# Patient Record
Sex: FEMALE | Race: WHITE | HISPANIC OR LATINO | Employment: OTHER | URBAN - METROPOLITAN AREA
[De-identification: names, ages, dates, MRNs, and addresses within clinical notes are randomized per-mention and may not be internally consistent; named-entity substitution may affect disease eponyms.]

---

## 2019-01-14 ENCOUNTER — HOSPITAL ENCOUNTER (OUTPATIENT)
Facility: MEDICAL CENTER | Age: 29
End: 2019-01-14
Attending: OBSTETRICS & GYNECOLOGY
Payer: COMMERCIAL

## 2019-01-14 LAB
ABO GROUP BLD: NORMAL
BASOPHILS # BLD AUTO: 0.3 % (ref 0–1.8)
BASOPHILS # BLD: 0.03 K/UL (ref 0–0.12)
BLD GP AB SCN SERPL QL: NORMAL
EOSINOPHIL # BLD AUTO: 0.15 K/UL (ref 0–0.51)
EOSINOPHIL NFR BLD: 1.7 % (ref 0–6.9)
ERYTHROCYTE [DISTWIDTH] IN BLOOD BY AUTOMATED COUNT: 40.3 FL (ref 35.9–50)
HCT VFR BLD AUTO: 39.7 % (ref 37–47)
HGB BLD-MCNC: 13.8 G/DL (ref 12–16)
IMM GRANULOCYTES # BLD AUTO: 0.02 K/UL (ref 0–0.11)
IMM GRANULOCYTES NFR BLD AUTO: 0.2 % (ref 0–0.9)
LYMPHOCYTES # BLD AUTO: 2.05 K/UL (ref 1–4.8)
LYMPHOCYTES NFR BLD: 22.8 % (ref 22–41)
MCH RBC QN AUTO: 29.7 PG (ref 27–33)
MCHC RBC AUTO-ENTMCNC: 34.8 G/DL (ref 33.6–35)
MCV RBC AUTO: 85.4 FL (ref 81.4–97.8)
MONOCYTES # BLD AUTO: 0.9 K/UL (ref 0–0.85)
MONOCYTES NFR BLD AUTO: 10 % (ref 0–13.4)
NEUTROPHILS # BLD AUTO: 5.84 K/UL (ref 2–7.15)
NEUTROPHILS NFR BLD: 65 % (ref 44–72)
NRBC # BLD AUTO: 0 K/UL
NRBC BLD-RTO: 0 /100 WBC
PLATELET # BLD AUTO: 199 K/UL (ref 164–446)
PMV BLD AUTO: 11.5 FL (ref 9–12.9)
RBC # BLD AUTO: 4.65 M/UL (ref 4.2–5.4)
RH BLD: NORMAL
WBC # BLD AUTO: 9 K/UL (ref 4.8–10.8)

## 2019-01-14 PROCEDURE — 87086 URINE CULTURE/COLONY COUNT: CPT

## 2019-01-14 PROCEDURE — 85025 COMPLETE CBC W/AUTO DIFF WBC: CPT

## 2019-01-14 PROCEDURE — 87389 HIV-1 AG W/HIV-1&-2 AB AG IA: CPT

## 2019-01-14 PROCEDURE — 86780 TREPONEMA PALLIDUM: CPT

## 2019-01-14 PROCEDURE — 87340 HEPATITIS B SURFACE AG IA: CPT

## 2019-01-14 PROCEDURE — 86901 BLOOD TYPING SEROLOGIC RH(D): CPT

## 2019-01-14 PROCEDURE — 86850 RBC ANTIBODY SCREEN: CPT

## 2019-01-14 PROCEDURE — 86762 RUBELLA ANTIBODY: CPT

## 2019-01-14 PROCEDURE — 86900 BLOOD TYPING SEROLOGIC ABO: CPT

## 2019-01-15 LAB
HBV SURFACE AG SER QL: NEGATIVE
HIV 1+2 AB+HIV1 P24 AG SERPL QL IA: NON REACTIVE
RUBV AB SER QL: 44.4 IU/ML
TREPONEMA PALLIDUM IGG+IGM AB [PRESENCE] IN SERUM OR PLASMA BY IMMUNOASSAY: NON REACTIVE

## 2019-01-17 LAB
BACTERIA UR CULT: NORMAL
SIGNIFICANT IND 70042: NORMAL
SITE SITE: NORMAL
SOURCE SOURCE: NORMAL

## 2021-12-20 ENCOUNTER — TELEPHONE (OUTPATIENT)
Dept: ENDOCRINOLOGY | Facility: MEDICAL CENTER | Age: 31
End: 2021-12-20

## 2021-12-20 NOTE — TELEPHONE ENCOUNTER
PT called to schedule NP appt with us. She has HPN so I did notify her that we did not accept that insurance here

## 2023-06-22 ENCOUNTER — HOSPITAL ENCOUNTER (EMERGENCY)
Facility: MEDICAL CENTER | Age: 33
End: 2023-06-22
Attending: OBSTETRICS & GYNECOLOGY | Admitting: OBSTETRICS & GYNECOLOGY
Payer: MEDICAID

## 2023-06-22 VITALS
HEIGHT: 62 IN | OXYGEN SATURATION: 98 % | BODY MASS INDEX: 32.76 KG/M2 | SYSTOLIC BLOOD PRESSURE: 121 MMHG | DIASTOLIC BLOOD PRESSURE: 75 MMHG | WEIGHT: 178 LBS | RESPIRATION RATE: 16 BRPM | HEART RATE: 87 BPM | TEMPERATURE: 97 F

## 2023-06-22 LAB
APPEARANCE UR: CLEAR
COLOR UR AUTO: YELLOW
GLUCOSE UR QL STRIP.AUTO: NEGATIVE MG/DL
KETONES UR QL STRIP.AUTO: ABNORMAL MG/DL
LEUKOCYTE ESTERASE UR QL STRIP.AUTO: NEGATIVE
NITRITE UR QL STRIP.AUTO: NEGATIVE
PH UR STRIP.AUTO: 6 [PH] (ref 5–8)
PROT UR QL STRIP: NEGATIVE MG/DL
RBC UR QL AUTO: NEGATIVE
SP GR UR STRIP.AUTO: 1.02 (ref 1–1.03)

## 2023-06-22 PROCEDURE — 99284 EMERGENCY DEPT VISIT MOD MDM: CPT

## 2023-06-22 PROCEDURE — 81002 URINALYSIS NONAUTO W/O SCOPE: CPT

## 2023-06-22 RX ORDER — METHIMAZOLE 5 MG/1
2.5 TABLET ORAL DAILY
COMMUNITY

## 2023-06-22 RX ORDER — BUSPIRONE HYDROCHLORIDE 10 MG/1
10 TABLET ORAL 2 TIMES DAILY
COMMUNITY

## 2023-06-23 NOTE — ED PROVIDER NOTES
Department of Obstetrics and Gynecology  Labor and Delivery History and Physical  OB ED Note    Date of Admission: 2023     ID: 33 y.o.  with IUP at 22w5d. HOLLY 10/21/2023    Primary OB: Dwayne Hogue M.D.     Attending OB: Katie Funk M.D.     CC: pink when she wiped     HPI: Adelina Mccarthy is a 33 y.o.  @22 weeks and 5 days who presents with a known placenta previa.  She was feeling well today when she went to the bathroom to urinate there was a small pink tinge to the wipe.  She had no pain associated with this she laid down for a little while and went back to check again and noted again a slight pink tinge.  She has had no berta bleeding.  No passage of clots no loss of fluid and she denies any pelvic pain.  She denies dysuria hematuria urgency or frequency.  She is not constipated.  She is feeling good fetal movement.  No unusual activities in the last couple of days.  She has been compliant with pelvic rest    ROS: 10 systems reviewed and negative except as noted above.    Obstetric History    - 2012, , female  G2 - 2014, , female, 5lb5oz  G3 - 2019, , male, 6lb8oz, complicated by cholestasis   G4 - SAb with D and C   G5 - SAb   G6 - Current pregnancy         Past Medical History  Surgical History   Graves disease  Depression     Appendectomy  Abdominoplasty  Breast augmentation  BBL  D and C       Gynecologic History  Social History   regular menses prior to pregnancy  denies Hx of abnormal pap smears.  denies Hx of STIs Tobacco: denies  EtOH: denies  Street Drugs: denies  She presents with her .   She is in nursing school      Medications  Allergies   No current facility-administered medications on file prior to encounter.     Current Outpatient Medications on File Prior to Encounter   Medication Sig Dispense Refill    methimazole (TAPAZOLE) 5 MG Tab Take 2.5 mg by mouth every day. Indications: Graves' Disease, Overactive Thyroid Gland      busPIRone  "(BUSPAR) 10 MG Tab tablet Take 10 mg by mouth 2 times a day. Indications: Major Depressive Disorder      Prenatal MV-Min-Fe Fum-FA-DHA (PRENATAL 1 PO) Take  by mouth.      Patient has no allergy information on record.       O: /75   Pulse 87   Temp 36.1 °C (97 °F) (Temporal)   Resp 16   Ht 1.575 m (5' 2\")   Wt 80.7 kg (178 lb)   SpO2 98%   BMI 32.56 kg/m²     Gen: NAD, AAO  Abd: Gravid, NTTP,Cephalic by [place], No rebound or guarding  Ext: NTTP, no edema, 2+DPP  Pelvic: SSE: No active bleeding. Cervix appears closed/thick. Very faint brown tinge to discharge    FHT:  present  [place]: not seen    Labs: O positive     A/P: Adelina Mccarthy is a 33 y.o.  at 22 weeks and 5 days with vaginal bleeding and a known placenta previa  Exam today is unremarkable.  There is no active bleeding.  She is not currently having any pain, and we are not picking up any contractions.  Strict bleeding and pain precautions have been reviewed with her.  At this time I think she can safely be discharged to home with precautions.   Fetal heart tones present  Rh+      Katie Funk M.D.,  2023, 9:45 PM    "

## 2023-06-23 NOTE — PROGRESS NOTES
"A , EDC 10/21 making her 22.5, presented to labor and delivery w/ c/o of \"ligh bleeding when wiping\" and pelciv pressure in presence of placenta previa. Pt denies RUCS and LOF; pt reports postive fetal movment.    2100: Pt oriented to triage room; monitors on/doppler; vital sx assessed.    : This RN called  Working and updated on pt status; MD to come to bedside.    ;  Working at bedside to assess pt; orders received.    :Discharge instructions given including  and placenta previa labor precautions, UC's, ROM, VB, abn FM, when to return to L&D, f/u with Dr. Hogue and [facility] , pelvic rest and modified bedrest. Questions answered at length. Pt and FOB verbalized understanding and agreement with POC and discharge. Discharge instructions signed.          "

## 2024-05-20 ENCOUNTER — HOSPITAL ENCOUNTER (OUTPATIENT)
Facility: MEDICAL CENTER | Age: 34
End: 2024-05-20
Attending: NURSE PRACTITIONER
Payer: COMMERCIAL

## 2024-05-20 ENCOUNTER — EMPLOYEE HEALTH (OUTPATIENT)
Dept: OCCUPATIONAL MEDICINE | Facility: CLINIC | Age: 34
End: 2024-05-20

## 2024-05-20 ENCOUNTER — EH NON-PROVIDER (OUTPATIENT)
Dept: OCCUPATIONAL MEDICINE | Facility: CLINIC | Age: 34
End: 2024-05-20

## 2024-05-20 DIAGNOSIS — Z02.1 PRE-EMPLOYMENT DRUG SCREENING: ICD-10-CM

## 2024-05-20 DIAGNOSIS — Z02.1 PRE-EMPLOYMENT HEALTH SCREENING EXAMINATION: ICD-10-CM

## 2024-05-20 DIAGNOSIS — Z02.89 ENCOUNTER FOR OCCUPATIONAL HEALTH ASSESSMENT: ICD-10-CM

## 2024-05-20 LAB
AMP AMPHETAMINE: NORMAL
BAR BARBITURATES: NORMAL
BZO BENZODIAZEPINES: NORMAL
COC COCAINE: NORMAL
INT CON NEG: NORMAL
INT CON POS: NORMAL
MDMA ECSTASY: NORMAL
MET METHAMPHETAMINES: NORMAL
MTD METHADONE: NORMAL
OPI OPIATES: NORMAL
OXY OXYCODONE: NORMAL
PCP PHENCYCLIDINE: NORMAL
POC URINE DRUG SCREEN OCDRS: NEGATIVE
THC: NORMAL

## 2024-05-20 PROCEDURE — 8915 PR COMPREHENSIVE PHYSICAL: Performed by: NURSE PRACTITIONER

## 2024-05-20 PROCEDURE — 80305 DRUG TEST PRSMV DIR OPT OBS: CPT | Performed by: NURSE PRACTITIONER

## 2024-05-20 PROCEDURE — 94375 RESPIRATORY FLOW VOLUME LOOP: CPT | Performed by: NURSE PRACTITIONER

## 2024-05-20 PROCEDURE — 86480 TB TEST CELL IMMUN MEASURE: CPT | Performed by: NURSE PRACTITIONER

## 2024-05-22 LAB
GAMMA INTERFERON BACKGROUND BLD IA-ACNC: 0.23 IU/ML
M TB IFN-G BLD-IMP: NEGATIVE
M TB IFN-G CD4+ BCKGRND COR BLD-ACNC: -0.02 IU/ML
MITOGEN IGNF BCKGRD COR BLD-ACNC: >10 IU/ML
QFT TB2 - NIL TBQ2: -0.01 IU/ML

## 2025-01-09 ENCOUNTER — EH NON-PROVIDER (OUTPATIENT)
Dept: OCCUPATIONAL MEDICINE | Facility: CLINIC | Age: 35
End: 2025-01-09

## 2025-01-09 DIAGNOSIS — Z02.89 ENCOUNTER FOR OCCUPATIONAL HEALTH EXAMINATION INVOLVING RESPIRATOR: ICD-10-CM

## 2025-01-09 PROCEDURE — 94375 RESPIRATORY FLOW VOLUME LOOP: CPT | Performed by: NURSE PRACTITIONER
